# Patient Record
Sex: MALE | Race: ASIAN | NOT HISPANIC OR LATINO | URBAN - METROPOLITAN AREA
[De-identification: names, ages, dates, MRNs, and addresses within clinical notes are randomized per-mention and may not be internally consistent; named-entity substitution may affect disease eponyms.]

---

## 2022-04-03 ENCOUNTER — INPATIENT (INPATIENT)
Facility: HOSPITAL | Age: 27
LOS: 1 days | Discharge: HOME | End: 2022-04-05
Attending: INTERNAL MEDICINE | Admitting: INTERNAL MEDICINE
Payer: MEDICAID

## 2022-04-03 VITALS
TEMPERATURE: 99 F | OXYGEN SATURATION: 97 % | HEIGHT: 68 IN | SYSTOLIC BLOOD PRESSURE: 129 MMHG | DIASTOLIC BLOOD PRESSURE: 74 MMHG | RESPIRATION RATE: 30 BRPM | HEART RATE: 122 BPM

## 2022-04-03 DIAGNOSIS — D72.829 ELEVATED WHITE BLOOD CELL COUNT, UNSPECIFIED: ICD-10-CM

## 2022-04-03 DIAGNOSIS — F32.9 MAJOR DEPRESSIVE DISORDER, SINGLE EPISODE, UNSPECIFIED: ICD-10-CM

## 2022-04-03 DIAGNOSIS — J45.909 UNSPECIFIED ASTHMA, UNCOMPLICATED: ICD-10-CM

## 2022-04-03 DIAGNOSIS — J45.901 UNSPECIFIED ASTHMA WITH (ACUTE) EXACERBATION: ICD-10-CM

## 2022-04-03 DIAGNOSIS — Z29.9 ENCOUNTER FOR PROPHYLACTIC MEASURES, UNSPECIFIED: ICD-10-CM

## 2022-04-03 DIAGNOSIS — R06.02 SHORTNESS OF BREATH: ICD-10-CM

## 2022-04-03 LAB
ALBUMIN SERPL ELPH-MCNC: 4.4 G/DL — SIGNIFICANT CHANGE UP (ref 3.3–5)
ALP SERPL-CCNC: 122 U/L — HIGH (ref 40–120)
ALT FLD-CCNC: 23 U/L — SIGNIFICANT CHANGE UP (ref 4–41)
ANION GAP SERPL CALC-SCNC: 17 MMOL/L — HIGH (ref 7–14)
AST SERPL-CCNC: 20 U/L — SIGNIFICANT CHANGE UP (ref 4–40)
B PERT DNA SPEC QL NAA+PROBE: SIGNIFICANT CHANGE UP
B PERT+PARAPERT DNA PNL SPEC NAA+PROBE: SIGNIFICANT CHANGE UP
BASOPHILS # BLD AUTO: 0.09 K/UL — SIGNIFICANT CHANGE UP (ref 0–0.2)
BASOPHILS NFR BLD AUTO: 0.5 % — SIGNIFICANT CHANGE UP (ref 0–2)
BILIRUB SERPL-MCNC: 0.5 MG/DL — SIGNIFICANT CHANGE UP (ref 0.2–1.2)
BORDETELLA PARAPERTUSSIS (RAPRVP): SIGNIFICANT CHANGE UP
BUN SERPL-MCNC: 9 MG/DL — SIGNIFICANT CHANGE UP (ref 7–23)
C PNEUM DNA SPEC QL NAA+PROBE: SIGNIFICANT CHANGE UP
CALCIUM SERPL-MCNC: 9.2 MG/DL — SIGNIFICANT CHANGE UP (ref 8.4–10.5)
CHLORIDE SERPL-SCNC: 103 MMOL/L — SIGNIFICANT CHANGE UP (ref 98–107)
CO2 SERPL-SCNC: 19 MMOL/L — LOW (ref 22–31)
CREAT SERPL-MCNC: 0.93 MG/DL — SIGNIFICANT CHANGE UP (ref 0.5–1.3)
D DIMER BLD IA.RAPID-MCNC: <150 NG/ML DDU — SIGNIFICANT CHANGE UP
EGFR: 117 ML/MIN/1.73M2 — SIGNIFICANT CHANGE UP
EOSINOPHIL # BLD AUTO: 0.3 K/UL — SIGNIFICANT CHANGE UP (ref 0–0.5)
EOSINOPHIL NFR BLD AUTO: 1.6 % — SIGNIFICANT CHANGE UP (ref 0–6)
FLUAV SUBTYP SPEC NAA+PROBE: SIGNIFICANT CHANGE UP
FLUBV RNA SPEC QL NAA+PROBE: SIGNIFICANT CHANGE UP
GLUCOSE SERPL-MCNC: 107 MG/DL — HIGH (ref 70–99)
HADV DNA SPEC QL NAA+PROBE: SIGNIFICANT CHANGE UP
HCOV 229E RNA SPEC QL NAA+PROBE: SIGNIFICANT CHANGE UP
HCOV HKU1 RNA SPEC QL NAA+PROBE: SIGNIFICANT CHANGE UP
HCOV NL63 RNA SPEC QL NAA+PROBE: SIGNIFICANT CHANGE UP
HCOV OC43 RNA SPEC QL NAA+PROBE: SIGNIFICANT CHANGE UP
HCT VFR BLD CALC: 44.6 % — SIGNIFICANT CHANGE UP (ref 39–50)
HGB BLD-MCNC: 14.8 G/DL — SIGNIFICANT CHANGE UP (ref 13–17)
HMPV RNA SPEC QL NAA+PROBE: SIGNIFICANT CHANGE UP
HPIV1 RNA SPEC QL NAA+PROBE: SIGNIFICANT CHANGE UP
HPIV2 RNA SPEC QL NAA+PROBE: SIGNIFICANT CHANGE UP
HPIV3 RNA SPEC QL NAA+PROBE: SIGNIFICANT CHANGE UP
HPIV4 RNA SPEC QL NAA+PROBE: SIGNIFICANT CHANGE UP
IANC: 15.97 K/UL — HIGH (ref 1.8–7.4)
IMM GRANULOCYTES NFR BLD AUTO: 0.6 % — SIGNIFICANT CHANGE UP (ref 0–1.5)
LYMPHOCYTES # BLD AUTO: 1.57 K/UL — SIGNIFICANT CHANGE UP (ref 1–3.3)
LYMPHOCYTES # BLD AUTO: 8.3 % — LOW (ref 13–44)
M PNEUMO DNA SPEC QL NAA+PROBE: SIGNIFICANT CHANGE UP
MAGNESIUM SERPL-MCNC: 1.7 MG/DL — SIGNIFICANT CHANGE UP (ref 1.6–2.6)
MCHC RBC-ENTMCNC: 27.8 PG — SIGNIFICANT CHANGE UP (ref 27–34)
MCHC RBC-ENTMCNC: 33.2 GM/DL — SIGNIFICANT CHANGE UP (ref 32–36)
MCV RBC AUTO: 83.8 FL — SIGNIFICANT CHANGE UP (ref 80–100)
MONOCYTES # BLD AUTO: 0.78 K/UL — SIGNIFICANT CHANGE UP (ref 0–0.9)
MONOCYTES NFR BLD AUTO: 4.1 % — SIGNIFICANT CHANGE UP (ref 2–14)
NEUTROPHILS # BLD AUTO: 15.97 K/UL — HIGH (ref 1.8–7.4)
NEUTROPHILS NFR BLD AUTO: 84.9 % — HIGH (ref 43–77)
NRBC # BLD: 0 /100 WBCS — SIGNIFICANT CHANGE UP
NRBC # FLD: 0 K/UL — SIGNIFICANT CHANGE UP
PHOSPHATE SERPL-MCNC: 2.4 MG/DL — LOW (ref 2.5–4.5)
PLATELET # BLD AUTO: 317 K/UL — SIGNIFICANT CHANGE UP (ref 150–400)
POTASSIUM SERPL-MCNC: 4.1 MMOL/L — SIGNIFICANT CHANGE UP (ref 3.5–5.3)
POTASSIUM SERPL-SCNC: 4.1 MMOL/L — SIGNIFICANT CHANGE UP (ref 3.5–5.3)
PROT SERPL-MCNC: 8.1 G/DL — SIGNIFICANT CHANGE UP (ref 6–8.3)
RAPID RVP RESULT: DETECTED
RBC # BLD: 5.32 M/UL — SIGNIFICANT CHANGE UP (ref 4.2–5.8)
RBC # FLD: 14.3 % — SIGNIFICANT CHANGE UP (ref 10.3–14.5)
RSV RNA SPEC QL NAA+PROBE: SIGNIFICANT CHANGE UP
RV+EV RNA SPEC QL NAA+PROBE: DETECTED
SARS-COV-2 RNA SPEC QL NAA+PROBE: SIGNIFICANT CHANGE UP
SODIUM SERPL-SCNC: 139 MMOL/L — SIGNIFICANT CHANGE UP (ref 135–145)
WBC # BLD: 18.82 K/UL — HIGH (ref 3.8–10.5)
WBC # FLD AUTO: 18.82 K/UL — HIGH (ref 3.8–10.5)

## 2022-04-03 PROCEDURE — 99291 CRITICAL CARE FIRST HOUR: CPT | Mod: 25

## 2022-04-03 PROCEDURE — 71045 X-RAY EXAM CHEST 1 VIEW: CPT | Mod: 26

## 2022-04-03 PROCEDURE — 99223 1ST HOSP IP/OBS HIGH 75: CPT | Mod: GC

## 2022-04-03 PROCEDURE — 93010 ELECTROCARDIOGRAM REPORT: CPT

## 2022-04-03 RX ORDER — IPRATROPIUM/ALBUTEROL SULFATE 18-103MCG
3 AEROSOL WITH ADAPTER (GRAM) INHALATION EVERY 6 HOURS
Refills: 0 | Status: DISCONTINUED | OUTPATIENT
Start: 2022-04-03 | End: 2022-04-03

## 2022-04-03 RX ORDER — BUPROPION HYDROCHLORIDE 150 MG/1
300 TABLET, EXTENDED RELEASE ORAL DAILY
Refills: 0 | Status: DISCONTINUED | OUTPATIENT
Start: 2022-04-04 | End: 2022-04-05

## 2022-04-03 RX ORDER — MAGNESIUM SULFATE 500 MG/ML
2 VIAL (ML) INJECTION ONCE
Refills: 0 | Status: COMPLETED | OUTPATIENT
Start: 2022-04-03 | End: 2022-04-03

## 2022-04-03 RX ORDER — ALBUTEROL 90 UG/1
2 AEROSOL, METERED ORAL EVERY 6 HOURS
Refills: 0 | Status: DISCONTINUED | OUTPATIENT
Start: 2022-04-03 | End: 2022-04-03

## 2022-04-03 RX ORDER — MOMETASONE FUROATE AND FORMOTEROL FUMARATE DIHYDRATE 200; 5 UG/1; UG/1
2 AEROSOL RESPIRATORY (INHALATION)
Qty: 0 | Refills: 0 | DISCHARGE

## 2022-04-03 RX ORDER — IPRATROPIUM/ALBUTEROL SULFATE 18-103MCG
3 AEROSOL WITH ADAPTER (GRAM) INHALATION ONCE
Refills: 0 | Status: COMPLETED | OUTPATIENT
Start: 2022-04-03 | End: 2022-04-03

## 2022-04-03 RX ORDER — DEXAMETHASONE 0.5 MG/5ML
6 ELIXIR ORAL ONCE
Refills: 0 | Status: COMPLETED | OUTPATIENT
Start: 2022-04-03 | End: 2022-04-03

## 2022-04-03 RX ORDER — SODIUM CHLORIDE 9 MG/ML
1000 INJECTION, SOLUTION INTRAVENOUS ONCE
Refills: 0 | Status: COMPLETED | OUTPATIENT
Start: 2022-04-03 | End: 2022-04-03

## 2022-04-03 RX ORDER — ACETAMINOPHEN 500 MG
975 TABLET ORAL ONCE
Refills: 0 | Status: COMPLETED | OUTPATIENT
Start: 2022-04-03 | End: 2022-04-03

## 2022-04-03 RX ORDER — IPRATROPIUM/ALBUTEROL SULFATE 18-103MCG
3 AEROSOL WITH ADAPTER (GRAM) INHALATION EVERY 4 HOURS
Refills: 0 | Status: DISCONTINUED | OUTPATIENT
Start: 2022-04-03 | End: 2022-04-05

## 2022-04-03 RX ORDER — BUPROPION HYDROCHLORIDE 150 MG/1
1 TABLET, EXTENDED RELEASE ORAL
Qty: 0 | Refills: 0 | DISCHARGE

## 2022-04-03 RX ORDER — LANOLIN ALCOHOL/MO/W.PET/CERES
3 CREAM (GRAM) TOPICAL AT BEDTIME
Refills: 0 | Status: DISCONTINUED | OUTPATIENT
Start: 2022-04-03 | End: 2022-04-05

## 2022-04-03 RX ORDER — ENOXAPARIN SODIUM 100 MG/ML
40 INJECTION SUBCUTANEOUS EVERY 12 HOURS
Refills: 0 | Status: DISCONTINUED | OUTPATIENT
Start: 2022-04-03 | End: 2022-04-05

## 2022-04-03 RX ORDER — KETOROLAC TROMETHAMINE 30 MG/ML
15 SYRINGE (ML) INJECTION ONCE
Refills: 0 | Status: DISCONTINUED | OUTPATIENT
Start: 2022-04-03 | End: 2022-04-03

## 2022-04-03 RX ADMIN — Medication 975 MILLIGRAM(S): at 12:51

## 2022-04-03 RX ADMIN — Medication 3 MILLILITER(S): at 18:58

## 2022-04-03 RX ADMIN — Medication 3 MILLILITER(S): at 11:20

## 2022-04-03 RX ADMIN — Medication 3 MILLILITER(S): at 11:19

## 2022-04-03 RX ADMIN — Medication 6 MILLIGRAM(S): at 11:19

## 2022-04-03 RX ADMIN — Medication 150 GRAM(S): at 12:50

## 2022-04-03 RX ADMIN — ENOXAPARIN SODIUM 40 MILLIGRAM(S): 100 INJECTION SUBCUTANEOUS at 21:40

## 2022-04-03 RX ADMIN — Medication 15 MILLIGRAM(S): at 14:31

## 2022-04-03 RX ADMIN — SODIUM CHLORIDE 1000 MILLILITER(S): 9 INJECTION, SOLUTION INTRAVENOUS at 14:31

## 2022-04-03 RX ADMIN — Medication 40 MILLIGRAM(S): at 22:06

## 2022-04-03 RX ADMIN — Medication 3 MILLILITER(S): at 22:25

## 2022-04-03 RX ADMIN — SODIUM CHLORIDE 1000 MILLILITER(S): 9 INJECTION, SOLUTION INTRAVENOUS at 12:50

## 2022-04-03 NOTE — H&P ADULT - ASSESSMENT
27M w/ PMH mild intermittent asthma (never intubated/hospitalized), depression presenting with 2d SOB and chest tightness in s/o viral URI admitted for treatment of asthma exacerbation.

## 2022-04-03 NOTE — ED PROVIDER NOTE - OBJECTIVE STATEMENT
28 yo M with h/o asthma presenting with SOB. Started overnight, worsening today. pt had cough and congestion since yesterday, today has chest tightness and SOB. No fevers, n/v, abd pain, diarrhea. Pt has not been intubated for asthma before. took albuterol at home, went to  and got breathing treatment there

## 2022-04-03 NOTE — H&P ADULT - PROBLEM SELECTOR PLAN 2
2d history of SOB; history significant for multiple contacts with URI symptoms. Patient also with chest tightness, however trop 7, d-dimer <150 with Wells score 1.5 (low-risk for PE), CXR w/o evidence of consolidation or infiltrate and afebrile. Asthma exacerbation most likely cause of SOB.  - treatment as above  - supplement O2 as needed

## 2022-04-03 NOTE — H&P ADULT - PROBLEM SELECTOR PLAN 1
Patient with history mild intermittent asthma, ordinarily well-controlled on Dulera 200-5 BID (never intubated, never ICU) presenting with SOB/chest tightness on background of viral URI (RVP positive for rhinovirus).   - s/p 2mg IV Magnesium  - c/w steroids:  - c/w duonebs q6h  - monitor off abx given afebrile, non-toxic appearing Patient with history mild intermittent asthma, ordinarily well-controlled on Dulera 200-5 BID (never intubated, never ICU) presenting with SOB/chest tightness on background of viral URI (RVP positive for rhinovirus).   - s/p 2g IV Magnesium  - c/w steroids: methylprednisone 40mg q8h  - c/w duonebs q4h  - monitor off abx given afebrile, non-toxic appearing

## 2022-04-03 NOTE — H&P ADULT - NSHPREVIEWOFSYSTEMS_GEN_ALL_CORE
REVIEW OF SYSTEMS:    CONSTITUTIONAL: No weakness, fevers or chills  EYES/ENT: No visual changes;  No vertigo or throat pain   NECK: No pain or stiffness  RESPIRATORY: +cough, +wheezing, no hemoptysis; +shortness of breath  CARDIOVASCULAR: No chest pain or palpitations  GASTROINTESTINAL: No abdominal or epigastric pain. No nausea, +vomiting, no hematemesis; No diarrhea or constipation. No melena or hematochezia.  GENITOURINARY: No dysuria, frequency or hematuria  NEUROLOGICAL: No numbness or weakness  SKIN: No itching, rashes

## 2022-04-03 NOTE — ED PROVIDER NOTE - NS ED ROS FT
GENERAL: No fever, chills  EYES: no vision changes, no discharge.   ENT: no difficulty swallowing or speaking   CARDIAC: no chest pain/pressure, lower extremity swelling  PULMONARY: +cough, +SOB  GI: no abdominal pain, n/v/d  : no dysuria  SKIN: no rashes  NEURO: no headache, lightheadedness, paresthesia  MSK: No joint pain, myalgia, weakness.

## 2022-04-03 NOTE — H&P ADULT - HISTORY OF PRESENT ILLNESS
27M w/ PMH asthma (never intubated), depression presenting with SOB and chest tightness x1d. Patient attempted to take medications at home w/o improvement at which point he reported to UC and failed medication there, was subsequently sent to ED.    ED vitals: 129/74, , RR 30 97% RA, temp 99.4  ED course: Given Duonebs x2, IV mag, Dexamethasone 6mg, tylenol x1 27M w/ PMH asthma (never intubated or hospitalized), depression presenting with SOB and chest tightness x2d. Two days ago, patient began experiencing congestion, rhinorrhea, sore throat, and cough productive of yellow sputum; denies hemoptysis. Denied fevers. He also experienced chest tightness which he described as difficulty inhaling a full breath. He was able to speak in full sentences and ambulate without issue. Patient lives with multiple family members who have been sick with URI in last few days. Denies recent abx use, denies steroid use. Usually able to exercise for 30 min before becoming SOB. Patient last used rescue inhaler several months ago when he experienced flu-like illness. He is adherent with Dulera inhaler BID. Patient attempted to take albuterol rescue inhaler at home w/o improvement at which point he reported to UC and failed nebulizer treatment there, and was subsequently sent to ED.    ED vitals: 129/74, , RR 30 97% RA, temp 99.4  ED course: Given Duonebs x2, IV mag, Dexamethasone 6mg, tylenol x1 27M w/ PMH asthma (never intubated or hospitalized), depression presenting with SOB and chest tightness x2d. Two days ago, patient began experiencing congestion, rhinorrhea, sore throat, and cough productive of yellow sputum; denies hemoptysis. Denied fevers. He also experienced chest tightness which he described as difficulty inhaling a full breath. He was able to speak in full sentences and ambulate without issue. Denies nighttime awakenings. Patient lives with multiple family members who have been sick with URI in last few days. Denies recent abx use, denies steroid use. Usually able to exercise for 30 min before becoming SOB. Patient last used rescue inhaler several months ago when he experienced flu-like illness. He is adherent with Dulera inhaler BID. Patient attempted to take albuterol rescue inhaler at home w/o improvement at which point he reported to UC and failed nebulizer treatment there, and was subsequently sent to ED.    ED vitals: 129/74, , RR 30 97% RA, temp 99.4  ED course: Given Duonebs x2, IV mag, Dexamethasone 6mg, tylenol x1

## 2022-04-03 NOTE — DISCHARGE NOTE PROVIDER - CARE PROVIDER_API CALL
Fabi Timmons)  Internal Medicine  [address] [place] [address]  Phone: [phone]  Fax: [phone]  Follow Up Time: 1 month    Jasmin Kim  Allergy & Immunology  [address] [place] [address] [address]  [place] [address]  Phone: [phone]  Fax: ([phone]  Follow Up Time: Routine

## 2022-04-03 NOTE — H&P ADULT - NSHPLABSRESULTS_GEN_ALL_CORE
.  LABS:                         14.8   18.82 )-----------( 317      ( 03 Apr 2022 11:44 )             44.6     04-03    139  |  103  |  9   ----------------------------<  107<H>  4.1   |  19<L>  |  0.93    Ca    9.2      03 Apr 2022 11:44  Phos  2.4     04-03  Mg     1.70     04-03    TPro  8.1  /  Alb  4.4  /  TBili  0.5  /  DBili  x   /  AST  20  /  ALT  23  /  AlkPhos  122<H>  04-03              RADIOLOGY, EKG & ADDITIONAL TESTS: Reviewed.

## 2022-04-03 NOTE — ED ADULT NURSE REASSESSMENT NOTE - NS ED NURSE REASSESS COMMENT FT1
report given to CDU Tosha sandoval. pt awaiting transport.
break rn. Patient resting in bed, no complaints. Patient denies chest pain, awaiting for results.

## 2022-04-03 NOTE — H&P ADULT - PROBLEM SELECTOR PLAN 4
Diet: Regular  DVT:   Dispo: home History of MDD with prior suicide attempt and psych hospitalization. Patient denies SI; feels stable on current medication.   - continue Welbutrin 300mg qd (home)

## 2022-04-03 NOTE — ED PROVIDER NOTE - CLINICAL SUMMARY MEDICAL DECISION MAKING FREE TEXT BOX
Chen - 28 yo M with h/o asthma presenting with SOB.  concern for asthma exacerbation 2/2 URI, low concern for PNA. will check labs, cxr, ekg and give steroids and duonebs

## 2022-04-03 NOTE — ED ADULT NURSE NOTE - OBJECTIVE STATEMENT
pt received spot 6. pt A+Ox3, with hx of asthma c/o difficulty breathing since yesterday. reports no improvement with inhalers. was seen at urgent care and given 1 neb treatment. with no improvement. pt placed on CM sinus tachycardia noted. labs sent. IVSL in place. medicated as ordered. vs as noted. awaiting further orders. will monitor.

## 2022-04-03 NOTE — H&P ADULT - PROBLEM SELECTOR PLAN 3
History of MDD with prior suicide attempt and psych hospitalization.   - continue Welbutrin 300mg qd (home) History of MDD with prior suicide attempt and psych hospitalization. Patient denies SI; feels stable on current medication.   - continue Welbutrin 300mg qd (home) WBC elevated to 18; patient reports hearing he has higher than normal WBC from outpatient doctor. Patient otherwise afebrile, non-toxic appearing. RVP pos for rhinovirus.  - monitor off abx  - expect WBC to rise w/ steroids

## 2022-04-03 NOTE — ED PROVIDER NOTE - ATTENDING CONTRIBUTION TO CARE
I have personally performed a face to face medical and diagnostic evaluation of the patient. I have discussed with and reviewed the Resident's note and agree with the History, ROS, Physical Exam and MDM unless otherwise indicated. A brief summary of my personal evaluation and impression can be found below.    27M hx of asthma, no prior admissions, no ICU, no intubations presents with a cc of c/f asthma exacerbation, started overnight a/w nasal congestion, sick contacts at home, and chest tightness took home asthma meds at home w/o improvement, no fevers, was seen at urgent care today for same cc, was told to come to ED for eval after failed meds there. No new LE swelling, no hormones, no prior dvt/pe. no hemoptysis.     All other ROS negative, except as above and as per HPI and ROS section.    VITALS: Initial triage and subsequent vitals have been reviewed by me.  GEN APPEARANCE: WDWN, alert, non-toxic, NAD  HEAD: Atraumatic.  EYES: PERRLa, EOMI, vision grossly intact.   NECK: Supple  CV: RRR, S1S2, no c/r/m/g. Cap refill <2 seconds. No bruits.   LUNGS: poor air movement, +wheezing b/l   ABDOMEN: Soft, NTND. No guarding or rebound.   MSK/EXT: No spinal or extremity point tenderness. No CVA ttp. Pelvis stable. No peripheral edema.  NEURO: Alert, follows commands. Weight bearing normal. Speech normal. Sensation and motor normal x4 extremities.   SKIN: Warm, dry and intact. No rash.  PSYCH: Appropriate    Plan/MDM: 27M hx of asthma, no prior admissions, no ICU, no intubations presents with a cc of c/f asthma exacerbation, started overnight a/w nasal congestion, sick contacts at home, and chest tightness took home asthma meds at home w/o improvement, no fevers, was seen at urgent care today for same cc, was told to come to ED for eval after failed meds there. exam vss non toxic PE as above ddx c/f likely RAD in setting of likely URI, will give nebs, steroids, consider mag, reassess, dispo pending clinical reeval.

## 2022-04-03 NOTE — ED PROVIDER NOTE - CARE PLAN
1 Principal Discharge DX:	Reactive airway disease  Secondary Diagnosis:	Sinus tachycardia  Secondary Diagnosis:	Asthma

## 2022-04-03 NOTE — ED PROVIDER NOTE - PHYSICAL EXAMINATION
Padmini Siddiqui MD  GENERAL: Patient awake alert NAD.  HEENT: NC/AT, Moist mucous membranes, EOMI.  LUNGS: +wheezes throughout out. tachypneic   CARDIAC: RRR, no m/r/g.    ABDOMEN: Soft, NT, ND, No rebound, guarding. No CVA tenderness.   EXT: No edema. No calf tenderness.   MSK: no pain with movement, no deformities.  NEURO: A&Ox3. Moving all extremities.  SKIN: Warm and dry. No rash.  PSYCH: Normal affect.

## 2022-04-03 NOTE — DISCHARGE NOTE PROVIDER - PROVIDER TOKENS
PROVIDER:[TOKEN:[[id number]],FOLLOWUP:[1 month]],PROVIDER:[TOKEN:[[id number]],FOLLOWUP:[Routine]]

## 2022-04-03 NOTE — DISCHARGE NOTE PROVIDER - HOSPITAL COURSE
HPI:  27M w/ PMH asthma (never intubated or hospitalized), depression presenting with SOB and chest tightness x2d. Two days ago, patient began experiencing congestion, rhinorrhea, sore throat, and cough productive of yellow sputum; denies hemoptysis. Denied fevers. He also experienced chest tightness which he described as difficulty inhaling a full breath. He was able to speak in full sentences and ambulate without issue. Denies nighttime awakenings. Patient lives with multiple family members who have been sick with URI in last few days. Denies recent abx use, denies steroid use. Usually able to exercise for 30 min before becoming SOB. Patient last used rescue inhaler several months ago when he experienced flu-like illness. He is adherent with Dulera inhaler BID. Patient attempted to take albuterol rescue inhaler at home w/o improvement at which point he reported to UC and failed nebulizer treatment there, and was subsequently sent to ED.    ED vitals: 129/74, , RR 30 97% RA, temp 99.4  ED course: Given Duonebs x2, IV mag 2g, Dexamethasone 6mg, tylenol x1     Hospital course: Patient admitted with likely asthma exacerbation in setting of viral URI (RVP positive for rhinovirus). CXR showed clear lungs, EKG showed sinus tachycardia w/o ST changes, and troponin was 7. Patient experienced improvement with first steroid dose, and was started on methylprednisone 40mg q8h, and standing duonebs, with rapid improvement in symptoms. He did not require supplemental oxygen. Patient's labs significant for elevated WBC to 18, likely in context of viral URI and reactive to asthma exacerbation. However, noted to have elevated WBC on previous outpatient labs, and patient encouraged to have repeat bloodwork once steroid course is complete. Stable for discharge with primary care follow-up.    HPI:  27M w/ PMH asthma (never intubated or hospitalized), depression presenting with SOB and chest tightness x2d. Two days ago, patient began experiencing congestion, rhinorrhea, sore throat, and cough productive of yellow sputum; denies hemoptysis. Denied fevers. He also experienced chest tightness which he described as difficulty inhaling a full breath. He was able to speak in full sentences and ambulate without issue. Denies nighttime awakenings. Patient lives with multiple family members who have been sick with URI in last few days. Denies recent abx use, denies steroid use. Usually able to exercise for 30 min before becoming SOB. Patient last used rescue inhaler several months ago when he experienced flu-like illness. He is adherent with Dulera inhaler BID. Patient attempted to take albuterol rescue inhaler at home w/o improvement at which point he reported to UC and failed nebulizer treatment there, and was subsequently sent to ED.    ED vitals: 129/74, , RR 30 97% RA, temp 99.4  ED course: Given Duonebs x2, IV mag 2g, Dexamethasone 6mg, tylenol x1     Hospital course: Patient admitted with likely asthma exacerbation in setting of viral URI (RVP positive for rhinovirus). CXR showed clear lungs, EKG showed sinus tachycardia w/o ST changes, and troponin was 7. Patient experienced improvement with first steroid dose, and was started on methylprednisone 40mg q8h, and standing duonebs, with rapid improvement in symptoms. He did not require supplemental oxygen. Patient's labs significant for elevated WBC to 18, likely in context of viral URI and reactive to asthma exacerbation. However, noted to have elevated WBC on previous outpatient labs, and patient encouraged to have repeat bloodwork once steroid course is complete. Will be discharged on 5d prednisone 40mg qd to complete 7d course of steroids. Stable for discharge with primary care and pulmonology follow-up.    HPI:  27M w/ PMH asthma (never intubated or hospitalized), depression presenting with SOB and chest tightness x2d. Two days ago, patient began experiencing congestion, rhinorrhea, sore throat, and cough productive of yellow sputum; denies hemoptysis. Denied fevers. He also experienced chest tightness which he described as difficulty inhaling a full breath. He was able to speak in full sentences and ambulate without issue. Denies nighttime awakenings. Patient lives with multiple family members who have been sick with URI in last few days. Denies recent abx use, denies steroid use. Usually able to exercise for 30 min before becoming SOB. Patient last used rescue inhaler several months ago when he experienced flu-like illness. He is adherent with Dulera inhaler BID. Patient attempted to take albuterol rescue inhaler at home w/o improvement at which point he reported to  and failed nebulizer treatment there, and was subsequently sent to ED.    ED vitals: 129/74, , RR 30 97% RA, temp 99.4  ED course: Given Duonebs x2, IV mag 2g, Dexamethasone 6mg, tylenol x1     Hospital course: Patient admitted with likely asthma exacerbation in setting of viral URI (RVP positive for rhinovirus). CXR showed clear lungs, EKG showed sinus tachycardia w/o ST changes, and troponin was 7. Patient experienced improvement with first steroid dose, and was started on methylprednisone 40mg q8h, and standing duonebs, with rapid improvement in symptoms. He did not require supplemental oxygen. Patient's labs significant for elevated WBC to 18, likely in context of viral URI and reactive to asthma exacerbation. However, noted to have elevated WBC on previous outpatient labs, and patient encouraged to have repeat bloodwork once steroid course is complete. Will be discharged on 5d prednisone 40mg qd to complete 7d course of steroids, as well as his home inhaler medications. Stable for discharge with primary care and pulmonology follow-up.

## 2022-04-03 NOTE — H&P ADULT - NSHPSOCIALHISTORY_GEN_ALL_CORE
Lives with his mom and sister in [place]; home has 1 flight of stairs which he navigates without difficulty.  Denies alcohol use, drug use. 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

## 2022-04-03 NOTE — ED ADULT TRIAGE NOTE - CHIEF COMPLAINT QUOTE
states" I am having chest tightness and SOB since last night>" h/o asthma. patient is tripoding and tachypneic.

## 2022-04-03 NOTE — DISCHARGE NOTE PROVIDER - NSDCMRMEDTOKEN_GEN_ALL_CORE_FT
buPROPion 300 mg/24 hours (XL) oral tablet, extended release: 1 tab(s) orally every 24 hours  Dulera 200 mcg-5 mcg/inh inhalation aerosol: 2 puff(s) inhaled 2 times a day   albuterol 90 mcg/inh inhalation aerosol: 2 puff(s) inhaled every 6 hours, As Needed  buPROPion 300 mg/24 hours (XL) oral tablet, extended release: 1 tab(s) orally every 24 hours  Dulera 200 mcg-5 mcg/inh inhalation aerosol: 2 puff(s) inhaled 2 times a day  predniSONE 20 mg oral tablet: 2 tab(s) orally once a day for 5 days beginning on April 6th.

## 2022-04-03 NOTE — DISCHARGE NOTE PROVIDER - NSDCCPCAREPLAN_GEN_ALL_CORE_FT
PRINCIPAL DISCHARGE DIAGNOSIS  Diagnosis: Asthma exacerbation  Assessment and Plan of Treatment: You were admitted to the hospital with asthma exacerbation, which is an acute worsening of your asthma. The cause was likely a viral upper respiratory infection, which caused you to experience cough, chest tightness, and wheezing. You were treated with IV steroids, IV magnesium, and frequent nebulizer treatments. You did not need extra oxygen. Please continue to use your Dulera inhaler twice daily. Please reach out to your doctor if you are using your rescue inhaler (Albuterol) more frequently than usual.   Please come back to the emergency room if:  - you experience shortness of breath not helped by inhalers  - you experience chest tightness or chest pain  - you are unable to catch your breath after exercise       PRINCIPAL DISCHARGE DIAGNOSIS  Diagnosis: Asthma exacerbation  Assessment and Plan of Treatment: You were admitted to the hospital with asthma exacerbation, which is an acute worsening of your asthma. The cause was likely a viral upper respiratory infection, which caused you to experience cough, chest tightness, and wheezing. You were treated with IV steroids, IV magnesium, and frequent nebulizer treatments. You did not need extra oxygen. Please continue to use your Dulera inhaler twice daily. Please reach out to your doctor if you are using your rescue inhaler (Albuterol) more frequently than usual.   Please schedule an appointment with a pulmonologist (information enclosed) to establish care for your asthma.   Please take prednisone 40mg daily for 5 days beginning on April 6th. Medication has been sent to your [facility].  Please come back to the emergency room if:  - you experience shortness of breath not helped by inhalers  - you experience chest tightness or chest pain  - you are unable to catch your breath after exercise

## 2022-04-03 NOTE — DISCHARGE NOTE PROVIDER - NSFOLLOWUPCLINICS_GEN_ALL_ED_FT
[facility] Pulmonolgy and Sleep Medicine  Pulmonology  [address] [place] [address], [place] [address]  Phone: [phone]  Fax:

## 2022-04-03 NOTE — H&P ADULT - NSHPPHYSICALEXAM_GEN_ALL_CORE
LOS:     VITALS:   T(C): 36.8 (04-03-22 @ 12:37), Max: 37.4 (04-03-22 @ 10:22)  HR: 129 (04-03-22 @ 14:35) (118 - 150)  BP: 129/83 (04-03-22 @ 14:35) (127/73 - 129/83)  RR: 16 (04-03-22 @ 14:35) (16 - 30)  SpO2: 100% (04-03-22 @ 14:35) (97% - 100%)    GENERAL: NAD, lying in bed comfortably  HEAD:  Atraumatic, Normocephalic  EYES: EOMI, PERRLA, conjunctiva and sclera clear  ENT: Moist mucous membranes  NECK: Supple, No JVD  CHEST/LUNG: ++inspiratory/expiratory wheezes throughout posterior lung fields. No accessory muscle use, no perioral cyanosis, speaking in full sentences  HEART: Regular rate and rhythm; No murmurs, rubs, or gallops  ABDOMEN: BSx4; Soft, nontender, nondistended  EXTREMITIES:  2+ Peripheral Pulses, brisk capillary refill. No clubbing, cyanosis, or edema  NERVOUS SYSTEM:  A&Ox3, moving all extremities spontaneously  SKIN: No rashes or lesions

## 2022-04-03 NOTE — H&P ADULT - ATTENDING COMMENTS
27M with PMH of asthma, depression presenting w/ SOB x2d. Pt reports otherwise well controlled asthma. No hx of hospitalizatoin/intubation. Usual triggers are pollen/allergen. Reports positive sick contact at home. Went to  for eval, but did not improve w/ nebs. Referred ED for further evaluation. On exam, pt was speaking full sentences, NAD. Chest w/ b/l end exp wheezes. Moving air. Abd soft NT. No LE edema. Initial labs notable for leukocytosis. Rhinovirus positive. CXR personally reviewed/interpreted: clear. EKG showed sinus tach. No acute ST/T changes. Admit for acute asthma exacerbation.    -Start solumedrol 40 q8h. Taper/transition to PO as he clinically improves.  -Continue duonebs.  -Resume home psych meds.  -Will need further follow up as OP for elevated WBC (which is known to pt). WBC expected to go up w/ steroids on board.    Rest of plan as above.

## 2022-04-04 DIAGNOSIS — R00.0 TACHYCARDIA, UNSPECIFIED: ICD-10-CM

## 2022-04-04 LAB
ALBUMIN SERPL ELPH-MCNC: 4.7 G/DL — SIGNIFICANT CHANGE UP (ref 3.3–5)
ALP SERPL-CCNC: 117 U/L — SIGNIFICANT CHANGE UP (ref 40–120)
ALT FLD-CCNC: 20 U/L — SIGNIFICANT CHANGE UP (ref 4–41)
ANION GAP SERPL CALC-SCNC: 16 MMOL/L — HIGH (ref 7–14)
AST SERPL-CCNC: 23 U/L — SIGNIFICANT CHANGE UP (ref 4–40)
BILIRUB SERPL-MCNC: 0.4 MG/DL — SIGNIFICANT CHANGE UP (ref 0.2–1.2)
BUN SERPL-MCNC: 8 MG/DL — SIGNIFICANT CHANGE UP (ref 7–23)
CALCIUM SERPL-MCNC: 9.6 MG/DL — SIGNIFICANT CHANGE UP (ref 8.4–10.5)
CHLORIDE SERPL-SCNC: 101 MMOL/L — SIGNIFICANT CHANGE UP (ref 98–107)
CO2 SERPL-SCNC: 19 MMOL/L — LOW (ref 22–31)
CREAT SERPL-MCNC: 0.91 MG/DL — SIGNIFICANT CHANGE UP (ref 0.5–1.3)
EGFR: 120 ML/MIN/1.73M2 — SIGNIFICANT CHANGE UP
GLUCOSE SERPL-MCNC: 114 MG/DL — HIGH (ref 70–99)
MAGNESIUM SERPL-MCNC: 2.3 MG/DL — SIGNIFICANT CHANGE UP (ref 1.6–2.6)
PHOSPHATE SERPL-MCNC: 3.2 MG/DL — SIGNIFICANT CHANGE UP (ref 2.5–4.5)
POTASSIUM SERPL-MCNC: 4.6 MMOL/L — SIGNIFICANT CHANGE UP (ref 3.5–5.3)
POTASSIUM SERPL-SCNC: 4.6 MMOL/L — SIGNIFICANT CHANGE UP (ref 3.5–5.3)
PROT SERPL-MCNC: 8.5 G/DL — HIGH (ref 6–8.3)
SODIUM SERPL-SCNC: 136 MMOL/L — SIGNIFICANT CHANGE UP (ref 135–145)

## 2022-04-04 PROCEDURE — 99233 SBSQ HOSP IP/OBS HIGH 50: CPT | Mod: GC

## 2022-04-04 RX ADMIN — Medication 3 MILLILITER(S): at 11:25

## 2022-04-04 RX ADMIN — ENOXAPARIN SODIUM 40 MILLIGRAM(S): 100 INJECTION SUBCUTANEOUS at 13:50

## 2022-04-04 RX ADMIN — Medication 3 MILLILITER(S): at 23:24

## 2022-04-04 RX ADMIN — Medication 3 MILLILITER(S): at 02:13

## 2022-04-04 RX ADMIN — ENOXAPARIN SODIUM 40 MILLIGRAM(S): 100 INJECTION SUBCUTANEOUS at 22:47

## 2022-04-04 RX ADMIN — Medication 3 MILLILITER(S): at 18:21

## 2022-04-04 RX ADMIN — Medication 3 MILLILITER(S): at 06:06

## 2022-04-04 RX ADMIN — Medication 40 MILLIGRAM(S): at 13:50

## 2022-04-04 RX ADMIN — Medication 40 MILLIGRAM(S): at 21:40

## 2022-04-04 RX ADMIN — Medication 40 MILLIGRAM(S): at 06:04

## 2022-04-04 RX ADMIN — BUPROPION HYDROCHLORIDE 300 MILLIGRAM(S): 150 TABLET, EXTENDED RELEASE ORAL at 18:26

## 2022-04-04 RX ADMIN — Medication 3 MILLILITER(S): at 16:37

## 2022-04-04 NOTE — PROGRESS NOTE ADULT - PROBLEM SELECTOR PLAN 4
History of MDD with prior suicide attempt and psych hospitalization. Patient denies SI; feels stable on current medication.   - continue Welbutrin 300mg qd (home) WBC elevated to 18; patient reports hearing he has higher than normal WBC from outpatient doctor. Patient otherwise afebrile, non-toxic appearing. RVP pos for rhinovirus.  - monitor off abx  - expect WBC to rise w/ steroids

## 2022-04-04 NOTE — PROGRESS NOTE ADULT - PROBLEM SELECTOR PLAN 1
Patient with history mild intermittent asthma, ordinarily well-controlled on Dulera 200-5 BID (never intubated, never ICU) presenting with SOB/chest tightness on background of viral URI (RVP positive for rhinovirus).   - s/p 2g IV Magnesium  - c/w steroids: methylprednisone 40mg q8h  - c/w duonebs q4h  - monitor off abx given afebrile, non-toxic appearing Patient with history mild intermittent asthma, ordinarily well-controlled on Dulera 200-5 BID (never intubated, never ICU) presenting with SOB/chest tightness on background of viral URI (RVP positive for rhinovirus), currently improving on solumedrol.   - s/p 2g IV Magnesium  - c/w steroids: methylprednisone 40mg q8h (4/3 - )  - c/w duonebs q4h  - monitor off abx given afebrile, non-toxic appearing

## 2022-04-04 NOTE — PROGRESS NOTE ADULT - PROBLEM SELECTOR PLAN 3
WBC elevated to 18; patient reports hearing he has higher than normal WBC from outpatient doctor. Patient otherwise afebrile, non-toxic appearing. RVP pos for rhinovirus.  - monitor off abx  - expect WBC to rise w/ steroids EKG with sinus tach w/o ST changes and trop wnl. Low suspicion for PE given Wells score 1.5 and no hypoxia, D-dimer <150. Denies chest pain, nausea, vomiting, diaphoresis, abdominal pain.   - continue to monitor on tele EKG with sinus tach w/o ST changes and trop wnl. Likely in setting of nebs + anxiety  - Low suspicion for PE given Wells score 1.5 and no hypoxia, D-dimer <150.   - Denies chest pain, nausea, vomiting, diaphoresis, abdominal pain.   - continue to monitor on tele

## 2022-04-04 NOTE — PATIENT PROFILE ADULT - FALL HARM RISK - HARM RISK INTERVENTIONS

## 2022-04-04 NOTE — PROGRESS NOTE ADULT - PROBLEM SELECTOR PLAN 5
Diet: Regular  DVT: Lovenox 40mg qd  Dispo: home History of MDD with prior suicide attempt and psych hospitalization. Patient denies SI; feels stable on current medication.   - continue Welbutrin 300mg qd (home)

## 2022-04-04 NOTE — SWALLOW BEDSIDE ASSESSMENT ADULT - COMMENTS
As per Internal Medicine note 4/4/22, pt is a "27M w/ PMH mild intermittent asthma (never intubated/hospitalized), depression presenting with 2d SOB and chest tightness in s/o viral URI admitted for treatment of asthma exacerbation. "    WBC is elevated. CXR 4/3/22 "The lungs are grossly clear. Heart is normal in size. The bones are intact."    Patient seen at bedside seated upright, awake/alert and oriented, during clinical swallow assessment this PM. Patient able to follow directions, answer questions, and engage in conversational discourse with the SLP. Patient reported tolerating a baseline diet of regular solids and thin liquids. Patient denied swallowing difficulties, odynophagia, and/or globus sensation. Patient was cooperative and accepted all PO trials.

## 2022-04-04 NOTE — SWALLOW BEDSIDE ASSESSMENT ADULT - SWALLOW EVAL: DIAGNOSIS
1) Functional oral stage of swallow for regular solids, puree, and thin fluids marked by adequate acceptance, bolus manipulation, mastication, and coordination. Adequate bolus collection and timely anterior to posterior oral transit/transfer noted. Adequate oral clearance observed. 2) Functional pharyngeal stage of swallow for regular solids, puree, and thin fluids marked by initiation of pharyngeal swallow trigger and hyolaryngeal excursion noted upon digital palpation. No overt signs/symptoms of penetration/aspiration noted.

## 2022-04-04 NOTE — PROGRESS NOTE ADULT - SUBJECTIVE AND OBJECTIVE BOX
Betsy Mendoza MD   PGY1 Internal Medicine       SUBJECTIVE / OVERNIGHT EVENTS:      No acute events overnight. Patient seen and evaluated at bedside. No fever/chills.  Denies SOB at rest, chest pain, palpitations, abdominal pain, nausea/vomiting.    MEDICATIONS  (STANDING):  albuterol/ipratropium for Nebulization 3 milliLiter(s) Nebulizer every 4 hours  buPROPion XL (24-Hour) . 300 milliGRAM(s) Oral daily  enoxaparin Injectable 40 milliGRAM(s) SubCutaneous every 12 hours  methylPREDNISolone sodium succinate Injectable 40 milliGRAM(s) IV Push every 8 hours    MEDICATIONS  (PRN):  melatonin 3 milliGRAM(s) Oral at bedtime PRN Insomnia      CAPILLARY BLOOD GLUCOSE        I&O's Summary      PHYSICAL EXAM:  Vital Signs Last 24 Hrs  T(C): 36.8 (04 Apr 2022 01:35), Max: 37.4 (03 Apr 2022 10:22)  T(F): 98.3 (04 Apr 2022 01:35), Max: 99.4 (03 Apr 2022 10:22)  HR: 107 (04 Apr 2022 01:35) (107 - 150)  BP: 116/62 (04 Apr 2022 01:35) (109/86 - 129/83)  BP(mean): --  RR: 19 (04 Apr 2022 01:35) (16 - 30)  SpO2: 95% (04 Apr 2022 01:35) (95% - 100%)    ____________________  PHYSICAL EXAM:  GENERAL: NAD, lying in bed comfortably  HEAD:  Atraumatic, Normocephalic  EYES: EOMI, PERRLA, conjunctiva and sclera clear  ENT: Moist mucous membranes  NECK: Supple, No JVD  CHEST/LUNG: ++inspiratory/expiratory wheezes throughout posterior lung fields. No accessory muscle use, no perioral cyanosis, speaking in full sentences  HEART: Regular rate and rhythm; No murmurs, rubs, or gallops  ABDOMEN: BSx4; Soft, nontender, nondistended  EXTREMITIES:  2+ Peripheral Pulses, brisk capillary refill. No clubbing, cyanosis, or edema  NERVOUS SYSTEM:  A&Ox3, moving all extremities spontaneously  SKIN: No rashes or lesions    LABS:                        14.8   18.82 )-----------( 317      ( 03 Apr 2022 11:44 )             44.6     04-03    139  |  103  |  9   ----------------------------<  107<H>  4.1   |  19<L>  |  0.93    Ca    9.2      03 Apr 2022 11:44  Phos  2.4     04-03  Mg     1.70     04-03    TPro  8.1  /  Alb  4.4  /  TBili  0.5  /  DBili  x   /  AST  20  /  ALT  23  /  AlkPhos  122<H>  04-03                    *******************************************************************************  *******************************************************************************  *******************************************************************************  *******************************************************************************  *******************************************************************************  CAPILLARY BLOOD GLUCOSE      acetaminophen     Tablet ..   975 milliGRAM(s) Oral (04-03 @ 12:51)    albuterol/ipratropium for Nebulization   3 milliLiter(s) Nebulizer (04-03 @ 18:58)   3 milliLiter(s) Nebulizer (04-03 @ 22:25)   3 milliLiter(s) Nebulizer (04-04 @ 02:13)   3 milliLiter(s) Nebulizer (04-04 @ 06:06)    albuterol/ipratropium for Nebulization.   3 milliLiter(s) Nebulizer (04-03 @ 11:19)    albuterol/ipratropium for Nebulization.   3 milliLiter(s) Nebulizer (04-03 @ 11:20)    albuterol/ipratropium for Nebulization.   3 milliLiter(s) Nebulizer (04-03 @ 11:20)    dexAMETHasone  Injectable   6 milliGRAM(s) IV Push (04-03 @ 11:19)    enoxaparin Injectable   40 milliGRAM(s) SubCutaneous (04-03 @ 21:40)    ketorolac   Injectable   15 milliGRAM(s) IV Push (04-03 @ 14:31)    lactated ringers Bolus   1000 mL/Hr IV Bolus (04-03 @ 12:50)    lactated ringers Bolus   1000 mL/Hr IV Bolus (04-03 @ 14:31)    magnesium sulfate  IVPB   150 mL/Hr IV Intermittent (04-03 @ 12:50)    methylPREDNISolone sodium succinate Injectable   40 milliGRAM(s) IV Push (04-03 @ 22:06)   40 milliGRAM(s) IV Push (04-04 @ 06:04)      Vital Signs Last 24 Hrs  T(C): 36.8 (04 Apr 2022 01:35), Max: 37.4 (03 Apr 2022 10:22)  T(F): 98.3 (04 Apr 2022 01:35), Max: 99.4 (03 Apr 2022 10:22)  HR: 107 (04 Apr 2022 01:35) (107 - 150)  BP: 116/62 (04 Apr 2022 01:35) (109/86 - 129/83)  BP(mean): --  RR: 19 (04 Apr 2022 01:35) (16 - 30)  SpO2: 95% (04 Apr 2022 01:35) (95% - 100%)     Betsy Mendoza MD   PGY1 Internal Medicine       SUBJECTIVE / OVERNIGHT EVENTS:      No acute events overnight. Patient seen and evaluated at bedside. No fever/chills.  Denies SOB at rest, chest pain, palpitations, abdominal pain, nausea/vomiting. Reports still feeling wheezy, but overall improved from yesterday.     MEDICATIONS  (STANDING):  albuterol/ipratropium for Nebulization 3 milliLiter(s) Nebulizer every 4 hours  buPROPion XL (24-Hour) . 300 milliGRAM(s) Oral daily  enoxaparin Injectable 40 milliGRAM(s) SubCutaneous every 12 hours  methylPREDNISolone sodium succinate Injectable 40 milliGRAM(s) IV Push every 8 hours    MEDICATIONS  (PRN):  melatonin 3 milliGRAM(s) Oral at bedtime PRN Insomnia      CAPILLARY BLOOD GLUCOSE        I&O's Summary      PHYSICAL EXAM:  Vital Signs Last 24 Hrs  T(C): 36.8 (04 Apr 2022 01:35), Max: 37.4 (03 Apr 2022 10:22)  T(F): 98.3 (04 Apr 2022 01:35), Max: 99.4 (03 Apr 2022 10:22)  HR: 107 (04 Apr 2022 01:35) (107 - 150)  BP: 116/62 (04 Apr 2022 01:35) (109/86 - 129/83)  BP(mean): --  RR: 19 (04 Apr 2022 01:35) (16 - 30)  SpO2: 95% (04 Apr 2022 01:35) (95% - 100%)    ____________________  PHYSICAL EXAM:  GENERAL: NAD, lying in bed comfortably  HEAD:  Atraumatic, Normocephalic  EYES: EOMI, PERRLA, conjunctiva and sclera clear  ENT: Moist mucous membranes  NECK: Supple, No JVD  CHEST/LUNG: ++inspiratory/expiratory wheezes throughout posterior lung fields. No accessory muscle use, no perioral cyanosis, speaking in full sentences  HEART: Regular rate and rhythm; No murmurs, rubs, or gallops  ABDOMEN: BSx4; Soft, nontender, nondistended  EXTREMITIES:  2+ Peripheral Pulses, brisk capillary refill. No clubbing, cyanosis, or edema  NERVOUS SYSTEM:  A&Ox3, moving all extremities spontaneously, normal gait  SKIN: No rashes or lesions    LABS:                        14.8   18.82 )-----------( 317      ( 03 Apr 2022 11:44 )             44.6     04-03    139  |  103  |  9   ----------------------------<  107<H>  4.1   |  19<L>  |  0.93    Ca    9.2      03 Apr 2022 11:44  Phos  2.4     04-03  Mg     1.70     04-03    TPro  8.1  /  Alb  4.4  /  TBili  0.5  /  DBili  x   /  AST  20  /  ALT  23  /  AlkPhos  122<H>  04-03                    *******************************************************************************  *******************************************************************************  *******************************************************************************  *******************************************************************************  *******************************************************************************  CAPILLARY BLOOD GLUCOSE      acetaminophen     Tablet ..   975 milliGRAM(s) Oral (04-03 @ 12:51)    albuterol/ipratropium for Nebulization   3 milliLiter(s) Nebulizer (04-03 @ 18:58)   3 milliLiter(s) Nebulizer (04-03 @ 22:25)   3 milliLiter(s) Nebulizer (04-04 @ 02:13)   3 milliLiter(s) Nebulizer (04-04 @ 06:06)    albuterol/ipratropium for Nebulization.   3 milliLiter(s) Nebulizer (04-03 @ 11:19)    albuterol/ipratropium for Nebulization.   3 milliLiter(s) Nebulizer (04-03 @ 11:20)    albuterol/ipratropium for Nebulization.   3 milliLiter(s) Nebulizer (04-03 @ 11:20)    dexAMETHasone  Injectable   6 milliGRAM(s) IV Push (04-03 @ 11:19)    enoxaparin Injectable   40 milliGRAM(s) SubCutaneous (04-03 @ 21:40)    ketorolac   Injectable   15 milliGRAM(s) IV Push (04-03 @ 14:31)    lactated ringers Bolus   1000 mL/Hr IV Bolus (04-03 @ 12:50)    lactated ringers Bolus   1000 mL/Hr IV Bolus (04-03 @ 14:31)    magnesium sulfate  IVPB   150 mL/Hr IV Intermittent (04-03 @ 12:50)    methylPREDNISolone sodium succinate Injectable   40 milliGRAM(s) IV Push (04-03 @ 22:06)   40 milliGRAM(s) IV Push (04-04 @ 06:04)      Vital Signs Last 24 Hrs  T(C): 36.8 (04 Apr 2022 01:35), Max: 37.4 (03 Apr 2022 10:22)  T(F): 98.3 (04 Apr 2022 01:35), Max: 99.4 (03 Apr 2022 10:22)  HR: 107 (04 Apr 2022 01:35) (107 - 150)  BP: 116/62 (04 Apr 2022 01:35) (109/86 - 129/83)  BP(mean): --  RR: 19 (04 Apr 2022 01:35) (16 - 30)  SpO2: 95% (04 Apr 2022 01:35) (95% - 100%)

## 2022-04-05 VITALS
HEART RATE: 102 BPM | DIASTOLIC BLOOD PRESSURE: 83 MMHG | RESPIRATION RATE: 18 BRPM | OXYGEN SATURATION: 96 % | TEMPERATURE: 98 F | SYSTOLIC BLOOD PRESSURE: 131 MMHG

## 2022-04-05 LAB
ANION GAP SERPL CALC-SCNC: 18 MMOL/L — HIGH (ref 7–14)
BUN SERPL-MCNC: 11 MG/DL — SIGNIFICANT CHANGE UP (ref 7–23)
CALCIUM SERPL-MCNC: 9.7 MG/DL — SIGNIFICANT CHANGE UP (ref 8.4–10.5)
CHLORIDE SERPL-SCNC: 99 MMOL/L — SIGNIFICANT CHANGE UP (ref 98–107)
CO2 SERPL-SCNC: 19 MMOL/L — LOW (ref 22–31)
CREAT SERPL-MCNC: 0.97 MG/DL — SIGNIFICANT CHANGE UP (ref 0.5–1.3)
EGFR: 111 ML/MIN/1.73M2 — SIGNIFICANT CHANGE UP
GLUCOSE SERPL-MCNC: 115 MG/DL — HIGH (ref 70–99)
HCT VFR BLD CALC: 46.6 % — SIGNIFICANT CHANGE UP (ref 39–50)
HGB BLD-MCNC: 14.9 G/DL — SIGNIFICANT CHANGE UP (ref 13–17)
MAGNESIUM SERPL-MCNC: 2 MG/DL — SIGNIFICANT CHANGE UP (ref 1.6–2.6)
MCHC RBC-ENTMCNC: 27.1 PG — SIGNIFICANT CHANGE UP (ref 27–34)
MCHC RBC-ENTMCNC: 32 GM/DL — SIGNIFICANT CHANGE UP (ref 32–36)
MCV RBC AUTO: 84.7 FL — SIGNIFICANT CHANGE UP (ref 80–100)
NRBC # BLD: 0 /100 WBCS — SIGNIFICANT CHANGE UP
NRBC # FLD: 0 K/UL — SIGNIFICANT CHANGE UP
PHOSPHATE SERPL-MCNC: 3.8 MG/DL — SIGNIFICANT CHANGE UP (ref 2.5–4.5)
PLATELET # BLD AUTO: 366 K/UL — SIGNIFICANT CHANGE UP (ref 150–400)
POTASSIUM SERPL-MCNC: 4.1 MMOL/L — SIGNIFICANT CHANGE UP (ref 3.5–5.3)
POTASSIUM SERPL-SCNC: 4.1 MMOL/L — SIGNIFICANT CHANGE UP (ref 3.5–5.3)
RBC # BLD: 5.5 M/UL — SIGNIFICANT CHANGE UP (ref 4.2–5.8)
RBC # FLD: 14.9 % — HIGH (ref 10.3–14.5)
SODIUM SERPL-SCNC: 136 MMOL/L — SIGNIFICANT CHANGE UP (ref 135–145)
WBC # BLD: 16.88 K/UL — HIGH (ref 3.8–10.5)
WBC # FLD AUTO: 16.88 K/UL — HIGH (ref 3.8–10.5)

## 2022-04-05 PROCEDURE — 99239 HOSP IP/OBS DSCHRG MGMT >30: CPT | Mod: GC

## 2022-04-05 RX ORDER — ALBUTEROL 90 UG/1
2 AEROSOL, METERED ORAL
Qty: 0 | Refills: 0 | DISCHARGE

## 2022-04-05 RX ADMIN — Medication 40 MILLIGRAM(S): at 15:19

## 2022-04-05 RX ADMIN — BUPROPION HYDROCHLORIDE 300 MILLIGRAM(S): 150 TABLET, EXTENDED RELEASE ORAL at 11:24

## 2022-04-05 RX ADMIN — Medication 3 MILLILITER(S): at 02:41

## 2022-04-05 RX ADMIN — Medication 3 MILLILITER(S): at 10:59

## 2022-04-05 RX ADMIN — ENOXAPARIN SODIUM 40 MILLIGRAM(S): 100 INJECTION SUBCUTANEOUS at 05:58

## 2022-04-05 RX ADMIN — Medication 40 MILLIGRAM(S): at 05:58

## 2022-04-05 RX ADMIN — Medication 3 MILLILITER(S): at 07:20

## 2022-04-05 NOTE — PROGRESS NOTE ADULT - ATTENDING COMMENTS
27M with PMH of asthma, depression presenting w/ SOB x2d admitted for acute asthma exacerbation in setting of entero/rhinovirus.     Patient seen and examined. States he feels better today - SOB is better and able to walk around without feeling SOB. He states he wants to go home today. On exam he still has diffuse bilateral inspiratory wheezes, improved from yesterday and also improvement in air entry. Discussed continuing prednisone for 5 more days to complete 7 day course and take inhalers as needed for SOB. He is agreeable with plan. Also encouraged outpatient follow-up with pulmonology. Patient stable for discharge home today, family will provide transport home.     Rest of plan as above  Discussed with Dr. Mendoza  33 minutes spent on discharge planning
27M with PMH of asthma, depression presenting w/ SOB x2d admitted for acute asthma exacerbation in setting of entero/rhinovirus.     Patient seen and examined. States he wants to leave today, feels anxious in the hospital and does want to stay here past 6PM tonight. On exam he still has diffuse bilateral wheezes and poor air entry. Explained he would benefit from one more day of observation in the hospital on IV steroids. He states he understands but cannot stay due to a mood issue. Explained that we will re-evaluate his lung exam later today, if still wheezing then discharge will be against medical advice - he understands. Encouraged outpatient follow-up with pulmonology.     Rest of plan as above  Discussed with Dr. Mendoza

## 2022-04-05 NOTE — DISCHARGE NOTE NURSING/CASE MANAGEMENT/SOCIAL WORK - PATIENT PORTAL LINK FT
You can access the FollowMyHealth Patient Portal offered by [facility] by registering at the following website: [email or web address]. By joining [facility]’s FollowMyHealth portal, you will also be able to view your health information using other applications (apps) compatible with our system.

## 2022-04-05 NOTE — PROGRESS NOTE ADULT - PROBLEM SELECTOR PLAN 3
EKG with sinus tach w/o ST changes and trop wnl. Likely in setting of nebs + anxiety  - Low suspicion for PE given Wells score 1.5 and no hypoxia, D-dimer <150.   - Denies chest pain, nausea, vomiting, diaphoresis, abdominal pain.   - continue to monitor on tele EKG with sinus tach w/o ST changes and trop wnl. Likely in setting of nebs + anxiety  - Low suspicion for PE given Wells score 1.5 and no hypoxia, D-dimer <150.   - Denies chest pain, nausea, vomiting, diaphoresis, abdominal pain.

## 2022-04-05 NOTE — PROGRESS NOTE ADULT - PROBLEM SELECTOR PLAN 5
History of MDD with prior suicide attempt and psych hospitalization. Patient denies SI; feels stable on current medication.   - continue Welbutrin 300mg qd (home)

## 2022-04-05 NOTE — PROGRESS NOTE ADULT - PROBLEM SELECTOR PLAN 4
WBC elevated to 18; patient reports hearing he has higher than normal WBC from outpatient doctor. Patient otherwise afebrile, non-toxic appearing. RVP pos for rhinovirus.  - monitor off abx  - expect WBC to rise w/ steroids

## 2022-04-05 NOTE — PROGRESS NOTE ADULT - ASSESSMENT
27M w/ PMH mild intermittent asthma (never intubated/hospitalized), depression presenting with 2d SOB and chest tightness in s/o viral URI admitted for treatment of asthma exacerbation. 
27M w/ PMH mild intermittent asthma (never intubated/hospitalized), depression presenting with 2d SOB and chest tightness in s/o viral URI admitted for treatment of asthma exacerbation.

## 2022-04-05 NOTE — PROGRESS NOTE ADULT - PROBLEM SELECTOR PLAN 1
Patient with history mild intermittent asthma, ordinarily well-controlled on Dulera 200-5 BID (never intubated, never ICU) presenting with SOB/chest tightness on background of viral URI (RVP positive for rhinovirus), currently improving on solumedrol.   - s/p 2g IV Magnesium  - c/w steroids: methylprednisone 40mg q8h (4/3 - )  - c/w duonebs q4h  - monitor off abx given afebrile, non-toxic appearing Patient with history mild intermittent asthma, ordinarily well-controlled on Dulera 200-5 BID (never intubated, never ICU) presenting with SOB/chest tightness on background of viral URI (RVP positive for rhinovirus), currently improving on solumedrol.   - s/p 2g IV Magnesium  - c/w steroids: methylprednisone 40mg q8h (4/3 -4/5 )      - dc on prednisone 40mg qd (4/6- 4/10) to complete 7d steroids total  - c/w duonebs q4h  - monitor off abx given afebrile, non-toxic appearing

## 2022-04-05 NOTE — PROGRESS NOTE ADULT - PROBLEM SELECTOR PLAN 2
2d history of SOB; history significant for multiple contacts with URI symptoms. Patient also with chest tightness, however trop 7, d-dimer <150 with Wells score 1.5 (low-risk for PE), CXR w/o evidence of consolidation or infiltrate and afebrile. Asthma exacerbation most likely cause of SOB.  - treatment as above  - supplement O2 as needed
2d history of SOB; history significant for multiple contacts with URI symptoms. Patient also with chest tightness, however trop 7, d-dimer <150 with Wells score 1.5 (low-risk for PE), CXR w/o evidence of consolidation or infiltrate and afebrile. Asthma exacerbation most likely cause of SOB.  - treatment as above  - supplement O2 as needed

## 2022-04-05 NOTE — PROGRESS NOTE ADULT - SUBJECTIVE AND OBJECTIVE BOX
Betsy Mendoza MD   PGY1 Internal Medicine       SUBJECTIVE / OVERNIGHT EVENTS:    Patient declining ambulatory sat.  No acute events overnight. Patient seen and evaluated at bedside. No fever/chills.  Denies SOB at rest, chest pain, palpitations, abdominal pain, nausea/vomiting.    MEDICATIONS  (STANDING):  albuterol/ipratropium for Nebulization 3 milliLiter(s) Nebulizer every 4 hours  buPROPion XL (24-Hour) . 300 milliGRAM(s) Oral daily  enoxaparin Injectable 40 milliGRAM(s) SubCutaneous every 12 hours  methylPREDNISolone sodium succinate Injectable 40 milliGRAM(s) IV Push every 8 hours    MEDICATIONS  (PRN):  melatonin 3 milliGRAM(s) Oral at bedtime PRN Insomnia      CAPILLARY BLOOD GLUCOSE        I&O's Summary      PHYSICAL EXAM:  Vital Signs Last 24 Hrs  T(C): 36.3 (05 Apr 2022 06:00), Max: 36.8 (04 Apr 2022 18:37)  T(F): 97.3 (05 Apr 2022 06:00), Max: 98.3 (04 Apr 2022 18:37)  HR: 94 (05 Apr 2022 06:00) (85 - 130)  BP: 125/75 (05 Apr 2022 06:00) (120/75 - 146/85)  BP(mean): --  RR: 18 (05 Apr 2022 06:00) (18 - 18)  SpO2: 96% (05 Apr 2022 06:00) (93% - 98%)    ____________________  PHYSICAL EXAM:  GENERAL: NAD, lying in bed comfortably  HEAD:  Atraumatic, Normocephalic  EYES: EOMI, PERRLA, conjunctiva and sclera clear  ENT: Moist mucous membranes  NECK: Supple, No JVD  CHEST/LUNG: ++inspiratory/expiratory wheezes throughout posterior lung fields. No accessory muscle use, no perioral cyanosis, speaking in full sentences  HEART: Regular rate and rhythm; No murmurs, rubs, or gallops  ABDOMEN: BSx4; Soft, nontender, nondistended  EXTREMITIES:  2+ Peripheral Pulses, brisk capillary refill. No clubbing, cyanosis, or edema  NERVOUS SYSTEM:  A&Ox3, moving all extremities spontaneously, normal gait  SKIN: No rashes or lesions      LABS:                        14.8   18.82 )-----------( 317      ( 03 Apr 2022 11:44 )             44.6     04-04    136  |  101  |  8   ----------------------------<  114<H>  4.6   |  19<L>  |  0.91    Ca    9.6      04 Apr 2022 07:22  Phos  3.2     04-04  Mg     2.30     04-04    TPro  8.5<H>  /  Alb  4.7  /  TBili  0.4  /  DBili  x   /  AST  23  /  ALT  20  /  AlkPhos  117  04-04                    *******************************************************************************  *******************************************************************************  *******************************************************************************  *******************************************************************************  *******************************************************************************  CAPILLARY BLOOD GLUCOSE          albuterol/ipratropium for Nebulization   3 milliLiter(s) Nebulizer (04-04 @ 11:25)   3 milliLiter(s) Nebulizer (04-04 @ 16:37)   3 milliLiter(s) Nebulizer (04-04 @ 18:21)   3 milliLiter(s) Nebulizer (04-04 @ 23:24)   3 milliLiter(s) Nebulizer (04-05 @ 02:41)    buPROPion XL (24-Hour) .   300 milliGRAM(s) Oral (04-04 @ 18:26)    enoxaparin Injectable   40 milliGRAM(s) SubCutaneous (04-04 @ 13:50)   40 milliGRAM(s) SubCutaneous (04-04 @ 22:47)   40 milliGRAM(s) SubCutaneous (04-05 @ 05:58)    methylPREDNISolone sodium succinate Injectable   40 milliGRAM(s) IV Push (04-04 @ 13:50)   40 milliGRAM(s) IV Push (04-04 @ 21:40)   40 milliGRAM(s) IV Push (04-05 @ 05:58)     Betsy Mednoza MD   PGY1 Internal Medicine     SUBJECTIVE / OVERNIGHT EVENTS:    Patient declining ambulatory sat.  No acute events overnight. Patient seen and evaluated at bedside. No fever/chills.  Denies SOB at rest, chest pain, palpitations, abdominal pain, nausea/vomiting.    MEDICATIONS  (STANDING):  albuterol/ipratropium for Nebulization 3 milliLiter(s) Nebulizer every 4 hours  buPROPion XL (24-Hour) . 300 milliGRAM(s) Oral daily  enoxaparin Injectable 40 milliGRAM(s) SubCutaneous every 12 hours  methylPREDNISolone sodium succinate Injectable 40 milliGRAM(s) IV Push every 8 hours    MEDICATIONS  (PRN):  melatonin 3 milliGRAM(s) Oral at bedtime PRN Insomnia      CAPILLARY BLOOD GLUCOSE        I&O's Summary      PHYSICAL EXAM:  Vital Signs Last 24 Hrs  T(C): 36.3 (05 Apr 2022 06:00), Max: 36.8 (04 Apr 2022 18:37)  T(F): 97.3 (05 Apr 2022 06:00), Max: 98.3 (04 Apr 2022 18:37)  HR: 94 (05 Apr 2022 06:00) (85 - 130)  BP: 125/75 (05 Apr 2022 06:00) (120/75 - 146/85)  BP(mean): --  RR: 18 (05 Apr 2022 06:00) (18 - 18)  SpO2: 96% (05 Apr 2022 06:00) (93% - 98%)    ____________________  PHYSICAL EXAM:  GENERAL: NAD, lying in bed comfortably  HEAD:  Atraumatic, Normocephalic  EYES: EOMI, PERRLA, conjunctiva and sclera clear  ENT: Moist mucous membranes  NECK: Supple, No JVD  CHEST/LUNG: + faint inspiratory wheezes throughout posterior lung fields. No accessory muscle use, no perioral cyanosis, speaking in full sentences  HEART: Regular rate and rhythm; No murmurs, rubs, or gallops  ABDOMEN: BSx4; Soft, nontender, nondistended  EXTREMITIES:  2+ Peripheral Pulses, brisk capillary refill. No clubbing, cyanosis, or edema  NERVOUS SYSTEM:  A&Ox3, moving all extremities spontaneously, normal gait  SKIN: No rashes or lesions      LABS:                        14.8   18.82 )-----------( 317      ( 03 Apr 2022 11:44 )             44.6     04-04    136  |  101  |  8   ----------------------------<  114<H>  4.6   |  19<L>  |  0.91    Ca    9.6      04 Apr 2022 07:22  Phos  3.2     04-04  Mg     2.30     04-04    TPro  8.5<H>  /  Alb  4.7  /  TBili  0.4  /  DBili  x   /  AST  23  /  ALT  20  /  AlkPhos  117  04-04                    *******************************************************************************  *******************************************************************************  *******************************************************************************  *******************************************************************************  *******************************************************************************  CAPILLARY BLOOD GLUCOSE          albuterol/ipratropium for Nebulization   3 milliLiter(s) Nebulizer (04-04 @ 11:25)   3 milliLiter(s) Nebulizer (04-04 @ 16:37)   3 milliLiter(s) Nebulizer (04-04 @ 18:21)   3 milliLiter(s) Nebulizer (04-04 @ 23:24)   3 milliLiter(s) Nebulizer (04-05 @ 02:41)    buPROPion XL (24-Hour) .   300 milliGRAM(s) Oral (04-04 @ 18:26)    enoxaparin Injectable   40 milliGRAM(s) SubCutaneous (04-04 @ 13:50)   40 milliGRAM(s) SubCutaneous (04-04 @ 22:47)   40 milliGRAM(s) SubCutaneous (04-05 @ 05:58)    methylPREDNISolone sodium succinate Injectable   40 milliGRAM(s) IV Push (04-04 @ 13:50)   40 milliGRAM(s) IV Push (04-04 @ 21:40)   40 milliGRAM(s) IV Push (04-05 @ 05:58)     Betsy Mendoza MD   PGY1 Internal Medicine     SUBJECTIVE / OVERNIGHT EVENTS:    Patient declining ambulatory sat.  No acute events overnight. Patient seen and evaluated at bedside. No fever/chills.  Denies SOB at rest, chest pain, palpitations, abdominal pain, nausea/vomiting. Reports occasional cough productive of yellow sputum. Reports improvement in wheezing; denies SOB while speaking or ambulating.     MEDICATIONS  (STANDING):  albuterol/ipratropium for Nebulization 3 milliLiter(s) Nebulizer every 4 hours  buPROPion XL (24-Hour) . 300 milliGRAM(s) Oral daily  enoxaparin Injectable 40 milliGRAM(s) SubCutaneous every 12 hours  methylPREDNISolone sodium succinate Injectable 40 milliGRAM(s) IV Push every 8 hours    MEDICATIONS  (PRN):  melatonin 3 milliGRAM(s) Oral at bedtime PRN Insomnia      CAPILLARY BLOOD GLUCOSE        I&O's Summary      PHYSICAL EXAM:  Vital Signs Last 24 Hrs  T(C): 36.3 (05 Apr 2022 06:00), Max: 36.8 (04 Apr 2022 18:37)  T(F): 97.3 (05 Apr 2022 06:00), Max: 98.3 (04 Apr 2022 18:37)  HR: 94 (05 Apr 2022 06:00) (85 - 130)  BP: 125/75 (05 Apr 2022 06:00) (120/75 - 146/85)  BP(mean): --  RR: 18 (05 Apr 2022 06:00) (18 - 18)  SpO2: 96% (05 Apr 2022 06:00) (93% - 98%)    ____________________  PHYSICAL EXAM:  GENERAL: NAD, lying in bed comfortably  HEAD:  Atraumatic, Normocephalic  EYES: EOMI, PERRLA, conjunctiva and sclera clear  ENT: Moist mucous membranes  NECK: Supple, No JVD  CHEST/LUNG: + faint inspiratory wheezes throughout posterior lung fields. No accessory muscle use, no perioral cyanosis, speaking in full sentences  HEART: Regular rate and rhythm; No murmurs, rubs, or gallops  ABDOMEN: BSx4; Soft, nontender, nondistended  EXTREMITIES:  2+ Peripheral Pulses, brisk capillary refill. No clubbing, cyanosis, or edema  NERVOUS SYSTEM:  A&Ox3, moving all extremities spontaneously, normal gait  SKIN: No rashes or lesions      LABS:                        14.8   18.82 )-----------( 317      ( 03 Apr 2022 11:44 )             44.6     04-04    136  |  101  |  8   ----------------------------<  114<H>  4.6   |  19<L>  |  0.91    Ca    9.6      04 Apr 2022 07:22  Phos  3.2     04-04  Mg     2.30     04-04    TPro  8.5<H>  /  Alb  4.7  /  TBili  0.4  /  DBili  x   /  AST  23  /  ALT  20  /  AlkPhos  117  04-04                    *******************************************************************************  *******************************************************************************  *******************************************************************************  *******************************************************************************  *******************************************************************************  CAPILLARY BLOOD GLUCOSE          albuterol/ipratropium for Nebulization   3 milliLiter(s) Nebulizer (04-04 @ 11:25)   3 milliLiter(s) Nebulizer (04-04 @ 16:37)   3 milliLiter(s) Nebulizer (04-04 @ 18:21)   3 milliLiter(s) Nebulizer (04-04 @ 23:24)   3 milliLiter(s) Nebulizer (04-05 @ 02:41)    buPROPion XL (24-Hour) .   300 milliGRAM(s) Oral (04-04 @ 18:26)    enoxaparin Injectable   40 milliGRAM(s) SubCutaneous (04-04 @ 13:50)   40 milliGRAM(s) SubCutaneous (04-04 @ 22:47)   40 milliGRAM(s) SubCutaneous (04-05 @ 05:58)    methylPREDNISolone sodium succinate Injectable   40 milliGRAM(s) IV Push (04-04 @ 13:50)   40 milliGRAM(s) IV Push (04-04 @ 21:40)   40 milliGRAM(s) IV Push (04-05 @ 05:58)

## 2022-12-05 NOTE — ED PROVIDER NOTE - OTHER FINDINGS
QTc 438 Benzoyl Peroxide Counseling: Patient counseled that medicine may cause skin irritation and bleach clothing.  In the event of skin irritation, the patient was advised to reduce the amount of the drug applied or use it less frequently.   The patient verbalized understanding of the proper use and possible adverse effects of benzoyl peroxide.  All of the patient's questions and concerns were addressed.

## 2024-05-14 NOTE — PATIENT PROFILE ADULT - FUNCTIONAL ASSESSMENT - BASIC MOBILITY 5.
Patient arrives per recommendation of EP for eval of A. Fib. Patient denies chest pain or palpitations, but reports SOB worse than usual.   3 = A little assistance